# Patient Record
Sex: MALE | Race: WHITE | NOT HISPANIC OR LATINO | Employment: FULL TIME | URBAN - METROPOLITAN AREA
[De-identification: names, ages, dates, MRNs, and addresses within clinical notes are randomized per-mention and may not be internally consistent; named-entity substitution may affect disease eponyms.]

---

## 2019-04-17 ENCOUNTER — HOSPITAL ENCOUNTER (EMERGENCY)
Facility: HOSPITAL | Age: 39
Discharge: HOME | End: 2019-04-17
Attending: EMERGENCY MEDICINE | Admitting: EMERGENCY MEDICINE

## 2019-04-17 ENCOUNTER — APPOINTMENT (OUTPATIENT)
Dept: GENERAL RADIOLOGY | Facility: HOSPITAL | Age: 39
End: 2019-04-17

## 2019-04-17 VITALS
BODY MASS INDEX: 25.67 KG/M2 | OXYGEN SATURATION: 98 % | TEMPERATURE: 98.4 F | WEIGHT: 200 LBS | DIASTOLIC BLOOD PRESSURE: 81 MMHG | RESPIRATION RATE: 18 BRPM | HEIGHT: 74 IN | SYSTOLIC BLOOD PRESSURE: 124 MMHG | HEART RATE: 99 BPM
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
## 2019-04-17 DIAGNOSIS — S30.0XXA CONTUSION OF LOWER BACK, INITIAL ENCOUNTER: ICD-10-CM

## 2019-04-17 DIAGNOSIS — Z87.19 HISTORY OF GASTROESOPHAGEAL REFLUX (GERD): ICD-10-CM

## 2019-04-17 DIAGNOSIS — M62.830 BACK MUSCLE SPASM: ICD-10-CM

## 2019-04-17 PROCEDURE — 99282 EMERGENCY DEPT VISIT SF MDM: CPT

## 2019-04-17 PROCEDURE — 72100 X-RAY EXAM L-S SPINE 2/3 VWS: CPT

## 2019-04-17 RX ORDER — OMEPRAZOLE 20 MG/1
20 CAPSULE, DELAYED RELEASE ORAL DAILY
COMMUNITY

## 2019-04-17 RX ORDER — CYCLOBENZAPRINE HCL 5 MG
5 TABLET ORAL 3 TIMES DAILY PRN
Qty: 15 TABLET | Refills: 0 | Status: SHIPPED | OUTPATIENT
Start: 2019-04-17 | End: 2021-10-05

## 2021-10-05 ENCOUNTER — OFFICE VISIT (OUTPATIENT)
Dept: FAMILY MEDICINE CLINIC | Facility: CLINIC | Age: 41
End: 2021-10-05

## 2021-10-05 VITALS
BODY MASS INDEX: 27.03 KG/M2 | WEIGHT: 210.6 LBS | HEIGHT: 74 IN | TEMPERATURE: 99.1 F | HEART RATE: 81 BPM | RESPIRATION RATE: 18 BRPM | SYSTOLIC BLOOD PRESSURE: 138 MMHG | OXYGEN SATURATION: 99 % | DIASTOLIC BLOOD PRESSURE: 96 MMHG

## 2021-10-05 DIAGNOSIS — K21.9 GASTROESOPHAGEAL REFLUX DISEASE WITHOUT ESOPHAGITIS: ICD-10-CM

## 2021-10-05 DIAGNOSIS — M79.18 ACUTE BUTTOCK PAIN: ICD-10-CM

## 2021-10-05 PROCEDURE — 99203 OFFICE O/P NEW LOW 30 MIN: CPT | Performed by: STUDENT IN AN ORGANIZED HEALTH CARE EDUCATION/TRAINING PROGRAM

## 2021-10-05 RX ORDER — MELOXICAM 7.5 MG/1
15 TABLET ORAL DAILY
Qty: 14 TABLET | Refills: 0 | Status: SHIPPED | OUTPATIENT
Start: 2021-10-05 | End: 2021-10-06

## 2021-10-05 NOTE — PROGRESS NOTES
New Patient Office Visit      Subjective      Chief Complaint:  Establish Care (seen at [facility] on friday afternoon to have cyst on left buttock excised, go on 10/15 to get stiches removed, patient tried otc analgesics (ibuprofen) but not helping )      History of Present Illness: Braxton Gold III is a 40 y.o. male who presents for a new patient visit.  He is coming here today to establish care but also to discuss pain from recent resection of cyst to left buttock.  Procedure was on 10/1 with Dr. [facility].  Patient has worsening pain and trouble sleeping or sitting down due to pain to the left buttock.  Patient denies any drainage or surrounding redness.     Past Medical History:   Diagnosis Date   • Cyst of buttocks 10/01/2021   • Kidney stones        Past Surgical History:   Procedure Laterality Date   • CYST REMOVAL      buttocks   • DENTAL PROCEDURE     • PAROTIDECTOMY         Family History   Problem Relation Age of Onset   • Pulmonary fibrosis Mother         age 2019    • Hypertension Mother    • Thyroid disease Mother    • Diabetes Father    • Cancer Father         metastatic kidney cancer   • Kidney nephrosis Father    • Cancer Paternal Grandmother         pancreatic   • Cancer Maternal Grandfather         lung       Social History     Socioeconomic History   • Marital status:      Spouse name: Not on file   • Number of children: Not on file   • Years of education: Not on file   • Highest education level: Not on file   Tobacco Use   • Smoking status: Never Smoker   • Smokeless tobacco: Never Used   Substance and Sexual Activity   • Alcohol use: Yes     Comment: occasionally   • Drug use: No   • Sexual activity: Yes         Current Outpatient Medications:   •  omeprazole (priLOSEC) 20 MG capsule, Take 20 mg by mouth Daily., Disp: , Rfl:   •  meloxicam (Mobic) 7.5 MG tablet, Take 2 tablets by mouth Daily., Disp: 14 tablet, Rfl: 0    Allergies   Allergen  "Reactions   • Bactrim [Sulfamethoxazole-Trimethoprim] Anaphylaxis   • Latex Rash   • Tramadol Other (See Comments)     HEADACHE       Objective     Physical Exam:  Vital Signs:  /96 (BP Location: Right arm, Patient Position: Sitting, Cuff Size: Adult)   Pulse 81   Temp 99.1 °F (37.3 °C) (Temporal)   Resp 18   Ht 188 cm (74\")   Wt 95.5 kg (210 lb 9.6 oz)   SpO2 99%   BMI 27.04 kg/m²      Physical Exam  Constitutional:       General: He is not in acute distress.     Appearance: He is not ill-appearing.   Cardiovascular:      Rate and Rhythm: Normal rate and regular rhythm.      Heart sounds: No murmur heard.   Pulmonary:      Effort: Pulmonary effort is normal.      Breath sounds: Normal breath sounds.   Abdominal:      Palpations: Abdomen is soft.   Left buttock with surgical incision that is clean dry and intact for sutures.  No surrounding erythema.  No drainage           Assessment / Plan      Assessment/Plan:   1. Acute buttock pain  -Area is healing well.  -I have prescribed meloxicam once daily to assist with pain management.   -Keep follow-up with dermatology  -Recommended donut pillow to help with pain.     2. Gastroesophageal reflux disease without esophagitis  -Okay to continue over-the-counter omeprazole      Follow Up:   Return in about 3 months (around 1/5/2022) for Wellness visit.    Jonathan Figueroa MD  Family Medicine - [place] [facility] [place]    "

## 2021-10-05 NOTE — PATIENT INSTRUCTIONS
Nice to meet you!     Take medication once daily.     Try special neck pillow or similar for relieve (donut pillow).     Please sign records from [facility] in [place] for past 5 years.   Records from [facility] [place].

## 2021-10-06 RX ORDER — KETOROLAC TROMETHAMINE 10 MG/1
10 TABLET, FILM COATED ORAL EVERY 6 HOURS PRN
Qty: 20 TABLET | Refills: 0 | Status: SHIPPED | OUTPATIENT
Start: 2021-10-06 | End: 2021-10-11